# Patient Record
Sex: FEMALE | Race: WHITE | NOT HISPANIC OR LATINO | Employment: OTHER | URBAN - METROPOLITAN AREA
[De-identification: names, ages, dates, MRNs, and addresses within clinical notes are randomized per-mention and may not be internally consistent; named-entity substitution may affect disease eponyms.]

---

## 2024-01-26 ENCOUNTER — OFFICE VISIT (OUTPATIENT)
Dept: PRIMARY CARE | Facility: CLINIC | Age: 74
End: 2024-01-26
Payer: COMMERCIAL

## 2024-01-26 ENCOUNTER — APPOINTMENT (OUTPATIENT)
Dept: PRIMARY CARE | Facility: CLINIC | Age: 74
End: 2024-01-26
Payer: COMMERCIAL

## 2024-01-26 VITALS
WEIGHT: 179 LBS | HEART RATE: 82 BPM | SYSTOLIC BLOOD PRESSURE: 152 MMHG | BODY MASS INDEX: 28.46 KG/M2 | OXYGEN SATURATION: 96 % | DIASTOLIC BLOOD PRESSURE: 82 MMHG

## 2024-01-26 DIAGNOSIS — Z79.899 HIGH RISK MEDICATION USE: ICD-10-CM

## 2024-01-26 DIAGNOSIS — M47.812 CERVICAL SPONDYLOSIS: ICD-10-CM

## 2024-01-26 DIAGNOSIS — I10 PRIMARY HYPERTENSION: ICD-10-CM

## 2024-01-26 DIAGNOSIS — M48.02 CERVICAL STENOSIS OF SPINE: Primary | ICD-10-CM

## 2024-01-26 PROCEDURE — 3079F DIAST BP 80-89 MM HG: CPT | Performed by: STUDENT IN AN ORGANIZED HEALTH CARE EDUCATION/TRAINING PROGRAM

## 2024-01-26 PROCEDURE — 99214 OFFICE O/P EST MOD 30 MIN: CPT | Performed by: STUDENT IN AN ORGANIZED HEALTH CARE EDUCATION/TRAINING PROGRAM

## 2024-01-26 PROCEDURE — 1159F MED LIST DOCD IN RCRD: CPT | Performed by: STUDENT IN AN ORGANIZED HEALTH CARE EDUCATION/TRAINING PROGRAM

## 2024-01-26 PROCEDURE — 80361 OPIATES 1 OR MORE: CPT

## 2024-01-26 PROCEDURE — 80368 SEDATIVE HYPNOTICS: CPT

## 2024-01-26 PROCEDURE — 3077F SYST BP >= 140 MM HG: CPT | Performed by: STUDENT IN AN ORGANIZED HEALTH CARE EDUCATION/TRAINING PROGRAM

## 2024-01-26 PROCEDURE — 1160F RVW MEDS BY RX/DR IN RCRD: CPT | Performed by: STUDENT IN AN ORGANIZED HEALTH CARE EDUCATION/TRAINING PROGRAM

## 2024-01-26 PROCEDURE — 1125F AMNT PAIN NOTED PAIN PRSNT: CPT | Performed by: STUDENT IN AN ORGANIZED HEALTH CARE EDUCATION/TRAINING PROGRAM

## 2024-01-26 PROCEDURE — 80346 BENZODIAZEPINES1-12: CPT

## 2024-01-26 PROCEDURE — 82570 ASSAY OF URINE CREATININE: CPT

## 2024-01-26 PROCEDURE — 80365 DRUG SCREENING OXYCODONE: CPT

## 2024-01-26 PROCEDURE — 80307 DRUG TEST PRSMV CHEM ANLYZR: CPT

## 2024-01-26 PROCEDURE — 80373 DRUG SCREENING TRAMADOL: CPT

## 2024-01-26 PROCEDURE — 80354 DRUG SCREENING FENTANYL: CPT

## 2024-01-26 PROCEDURE — 80358 DRUG SCREENING METHADONE: CPT

## 2024-01-26 RX ORDER — DORZOLAMIDE HYDROCHLORIDE AND TIMOLOL MALEATE 20; 5 MG/ML; MG/ML
SOLUTION/ DROPS OPHTHALMIC
COMMUNITY

## 2024-01-26 RX ORDER — IBUPROFEN 800 MG/1
800 TABLET ORAL 3 TIMES DAILY PRN
COMMUNITY
End: 2024-01-30 | Stop reason: SDUPTHER

## 2024-01-26 RX ORDER — LISINOPRIL 40 MG/1
40 TABLET ORAL DAILY
COMMUNITY
End: 2024-01-29 | Stop reason: SDUPTHER

## 2024-01-26 RX ORDER — BRIMONIDINE TARTRATE 2 MG/ML
SOLUTION/ DROPS OPHTHALMIC
COMMUNITY
Start: 2023-09-27

## 2024-01-26 RX ORDER — LOTEPREDNOL ETABONATE 5 MG/ML
SUSPENSION/ DROPS OPHTHALMIC
COMMUNITY
Start: 2024-01-25 | End: 2024-06-04 | Stop reason: ALTCHOICE

## 2024-01-26 RX ORDER — ASPIRIN 81 MG/1
TABLET ORAL
COMMUNITY
Start: 2018-05-25

## 2024-01-26 RX ORDER — TRAMADOL HYDROCHLORIDE 50 MG/1
4 TABLET ORAL NIGHTLY PRN
COMMUNITY
Start: 2023-11-24 | End: 2024-01-29 | Stop reason: SDUPTHER

## 2024-01-26 RX ORDER — TRAMADOL HYDROCHLORIDE 50 MG/1
4 TABLET ORAL NIGHTLY PRN
Qty: 10 TABLET | Status: CANCELLED | OUTPATIENT
Start: 2024-01-26

## 2024-01-26 RX ORDER — LATANOPROST 50 UG/ML
SOLUTION/ DROPS OPHTHALMIC
COMMUNITY

## 2024-01-26 RX ORDER — IBUPROFEN 800 MG/1
800 TABLET ORAL 3 TIMES DAILY PRN
Status: CANCELLED | OUTPATIENT
Start: 2024-01-26

## 2024-01-26 RX ORDER — LISINOPRIL 40 MG/1
40 TABLET ORAL DAILY
Qty: 90 TABLET | Refills: 1 | Status: CANCELLED | OUTPATIENT
Start: 2024-01-26

## 2024-01-26 ASSESSMENT — PAIN SCALES - GENERAL: PAINLEVEL: 4

## 2024-01-26 ASSESSMENT — PATIENT HEALTH QUESTIONNAIRE - PHQ9
1. LITTLE INTEREST OR PLEASURE IN DOING THINGS: NOT AT ALL
SUM OF ALL RESPONSES TO PHQ9 QUESTIONS 1 AND 2: 0
2. FEELING DOWN, DEPRESSED OR HOPELESS: NOT AT ALL

## 2024-01-26 NOTE — PROGRESS NOTES
GENERAL PROGRESS NOTE    Chief Complaint   Patient presents with    [facility]    Hypertension       HPI  Breonna Russo is a 73 y.o. female that presents today for HTN check and medication review. Prior pt of Charisse Stephenson, this is our first meeting. Last visit 9/2023 for HTN check. BP elevated at that time with plan to add antihypertensive agent if persistent. No follow up since.     #HTN  Current medications - Lisinopril 40 mg   BP today - 152/82  Home BP readings - not checking frequently    #Chronic neck and back pain, OA - multiple joints  Has had Rt shoulder, Lt, knee, Rt 1st metatarsal replacements  Has been under PM Dr. Pandya, was being prescribed percocet 5-325mg from Dr. Pandya off and on from 8/2022 - 1/2023. During that time she also has tramadol prescribed by [facility] intermittently. She says she has not seen Dr. Pandya in long while and does not have any planned follow up due to no further interventions were planned for her condition. She has been using the Tramadol PRN and feels she does well on that. Her last fill was for 30 pills on 11/24/2023. She has good days and bad days with her neck and back pain, on the bad days she struggles with her ADL's and the tramadol helps with this.  No recreational drug use. Drinks ocass wine in the evening up to 2 glasses, never drinks wine on days she takes Tramadol.     OARRS:  Kezia Key MD on 1/26/2024  8:39 AM  I have personally reviewed the OARRS report for Breonna Russo. I have considered the risks of abuse, dependence, addiction and diversion    Is the patient prescribed a combination of a benzodiazepine and opioid?  No  Last Urine Drug Screen / ordered today: Yes    Controlled Substance Agreement:  Date of the Last Agreement: TODAY 1/26/24  Reviewed Controlled Substance Agreement including but not limited to the benefits, risks, and  alternatives to treatment with a Controlled Substance medication(s).    Opioids:  What is the patient's goal of therapy? Improve daily functioning  Is this being achieved with current treatment? yes    I have calculated the patient's Morphine Dose Equivalent (MED): 5    I feel that it is clinically indicated to continue this current medication regimen after consideration of alternative therapies, and other non-opioid treatment.    Opioid Risk Screening:  Family History of Substance Abuse  Alcohol: 0 (2024  8:00 AM)  Illegal Dru (2024  8:00 AM)  Prescription Dru (2024  8:00 AM)    Personal History of Substance Abuse  Alcohol: 0 (2024  8:00 AM)  Illegal Drugs: 0 (2024  8:00 AM)  Prescription Drugs: 0 (2024  8:00 AM)        Pain Assessment:  No data recorded    CPE due 2024    Vital signs   /82   Pulse 82   Wt 81.2 kg (179 lb)   SpO2 96%   BMI 28.46 kg/m²      No current outpatient medications on file.     No current facility-administered medications for this visit.       Review of Systems   Constitutional:  Negative for chills and fever.   Eyes:  Negative for visual disturbance.   Respiratory:  Negative for shortness of breath.    Cardiovascular:  Negative for chest pain, palpitations and leg swelling.   Musculoskeletal:  Positive for back pain (chronic) and neck pain (chronic).   Neurological:  Negative for dizziness and headaches.        Physical Exam  Vitals and nursing note reviewed.   Constitutional:       General: She is not in acute distress.     Appearance: She is not ill-appearing.   Cardiovascular:      Rate and Rhythm: Normal rate and regular rhythm.      Heart sounds: Normal heart sounds. No murmur heard.  Pulmonary:      Effort: Pulmonary effort is normal. No respiratory distress.      Breath sounds: Normal breath sounds. No wheezing, rhonchi or rales.   Musculoskeletal:      Right lower leg: No edema.      Left lower leg: No edema.         Assessment/Plan    1. Cervical stenosis of spine  2. Cervical spondylosis  3. High risk medication use  3. High risk medication use  Currently doing well current regimen. Did discuss with her referral to pain management to determine long term plan for pain control and if current regimen is best option or if there are alternatives that have potentially not yet been explored. She is agreeable to this. She would like to go back to Dr. Pandya for this. Will schedule  Dosing and appropriate use discussed  Risks/Precautions discussed in detail  No red flags identified  OARRS reviewed, controlled med contract updated.  Return 3 mos for medication review  Urine testing done today  Controlled substance agreement updated today  - ibuprofen (IBU) 800 mg tablet; Take 1 tablet (800 mg) by mouth 3 times a day as needed for moderate pain (4 - 6).  Dispense: 60 tablet; Refill: 1  - Opiate/Opioid/Benzo Prescription Compliance  - OOB Internal Tracking    4. Primary hypertension  Needs better control. Add amlodipine 5mg daily. Medication indications, use and potential side effects discussed in detail. She will begin home monitoring 2-3 x weekly. Proper technique reviewed. Follow up 3 mos for HTN check.       Kezia Key MD  01/25/24  10:05 PM

## 2024-01-27 LAB
AMPHETAMINES UR QL SCN: NORMAL
BARBITURATES UR QL SCN: NORMAL
BZE UR QL SCN: NORMAL
CANNABINOIDS UR QL SCN: NORMAL
CREAT UR-MCNC: 83.7 MG/DL (ref 20–320)
PCP UR QL SCN: NORMAL

## 2024-01-29 DIAGNOSIS — I10 HYPERTENSION, UNSPECIFIED TYPE: Primary | ICD-10-CM

## 2024-01-29 DIAGNOSIS — M48.02 CERVICAL STENOSIS OF SPINAL CANAL: ICD-10-CM

## 2024-01-29 DIAGNOSIS — M47.812 CERVICAL SPONDYLOSIS: ICD-10-CM

## 2024-01-29 NOTE — TELEPHONE ENCOUNTER
Patient stated that her provider was going to send over a prescription for an additional blood pressure I did not see that on her list .      Rx Refill Request Telephone Encounter    Name:  Breonna Russo  :  [id number]  Medication Name:  lisinopril 40 mg tablet       Specific Pharmacy location:  [facility]     Rx Refill Request Telephone Encounter    Name:  Breonna Russo  :  [id number]  Medication Name:   mg tablet     Specific Pharmacy location:  [facility]    Best number to reach patient:  [phone]    Rx Refill Request Telephone Encounter    Name:  Breonna Russo  :  [id number]  Medication Name:   traMADol (Ultram) 50 mg tablet

## 2024-01-30 ENCOUNTER — TELEPHONE (OUTPATIENT)
Dept: PRIMARY CARE | Facility: CLINIC | Age: 74
End: 2024-01-30
Payer: COMMERCIAL

## 2024-01-30 RX ORDER — IBUPROFEN 800 MG/1
800 TABLET ORAL 3 TIMES DAILY PRN
Qty: 60 TABLET | Refills: 1 | Status: SHIPPED | OUTPATIENT
Start: 2024-01-30 | End: 2024-01-30 | Stop reason: SDUPTHER

## 2024-01-30 RX ORDER — AMLODIPINE BESYLATE 5 MG/1
5 TABLET ORAL DAILY
Qty: 90 TABLET | Refills: 1 | Status: SHIPPED | OUTPATIENT
Start: 2024-01-30 | End: 2024-04-30 | Stop reason: SINTOL

## 2024-01-30 RX ORDER — TRAMADOL HYDROCHLORIDE 50 MG/1
50 TABLET ORAL DAILY PRN
Qty: 30 TABLET | Refills: 0 | Status: SHIPPED | OUTPATIENT
Start: 2024-01-30 | End: 2024-04-30 | Stop reason: SDUPTHER

## 2024-01-30 RX ORDER — IBUPROFEN 800 MG/1
800 TABLET ORAL 3 TIMES DAILY PRN
Qty: 60 TABLET | Refills: 1 | Status: SHIPPED | OUTPATIENT
Start: 2024-01-30 | End: 2024-04-30 | Stop reason: SDUPTHER

## 2024-01-30 RX ORDER — LISINOPRIL 40 MG/1
40 TABLET ORAL DAILY
Qty: 90 TABLET | Refills: 1 | Status: SHIPPED | OUTPATIENT
Start: 2024-01-30 | End: 2024-06-04 | Stop reason: SDUPTHER

## 2024-01-30 NOTE — TELEPHONE ENCOUNTER
Lisinopril and tramadol rx sent   New rx for amlodipine sent   She needs follow up scheduled for 90 days from last visit (1/26/24) for med review - tramadol and HTN check

## 2024-01-30 NOTE — TELEPHONE ENCOUNTER
RX REQUEST  [facility]INO GIBSON   LISINOPRIL  IBUPROFEN 800 MG   TRAMADOL  AMLODIPINE     PT WILL BE OUT OF MEDS BY THE END OF WEEK

## 2024-02-01 ENCOUNTER — TELEPHONE (OUTPATIENT)
Dept: PRIMARY CARE | Facility: CLINIC | Age: 74
End: 2024-02-01
Payer: COMMERCIAL

## 2024-02-01 DIAGNOSIS — I10 HYPERTENSION, UNSPECIFIED TYPE: ICD-10-CM

## 2024-02-02 ASSESSMENT — ENCOUNTER SYMPTOMS
HEADACHES: 0
CHILLS: 0
DIZZINESS: 0
FEVER: 0
PALPITATIONS: 0
NECK PAIN: 1
BACK PAIN: 1
SHORTNESS OF BREATH: 0

## 2024-04-29 PROBLEM — F10.10 ALCOHOL ABUSE: Status: ACTIVE | Noted: 2023-05-05

## 2024-04-29 PROBLEM — R76.8 POSITIVE ANA (ANTINUCLEAR ANTIBODY): Status: ACTIVE | Noted: 2024-04-29

## 2024-04-29 PROBLEM — I10 BENIGN ESSENTIAL HYPERTENSION: Status: ACTIVE | Noted: 2020-07-09

## 2024-04-29 PROBLEM — M85.60 BONE CYST: Status: ACTIVE | Noted: 2024-04-29

## 2024-04-29 PROBLEM — L30.9 ECZEMA: Status: ACTIVE | Noted: 2018-08-27

## 2024-04-29 PROBLEM — N32.81 OAB (OVERACTIVE BLADDER): Status: ACTIVE | Noted: 2024-04-29

## 2024-04-29 PROBLEM — H40.9 GLAUCOMA: Status: ACTIVE | Noted: 2024-04-29

## 2024-04-29 PROBLEM — M19.90 OSTEOARTHRITIS: Status: ACTIVE | Noted: 2019-05-02

## 2024-04-29 PROBLEM — M87.00 AVASCULAR NECROSIS OF BONE (MULTI): Status: ACTIVE | Noted: 2024-04-29

## 2024-04-29 PROBLEM — M17.9 OSTEOARTHRITIS OF KNEE: Status: ACTIVE | Noted: 2019-04-09

## 2024-04-29 RX ORDER — PREDNISOLONE ACETATE 10 MG/ML
SUSPENSION/ DROPS OPHTHALMIC
COMMUNITY
Start: 2024-01-26 | End: 2024-04-30 | Stop reason: ALTCHOICE

## 2024-04-30 ENCOUNTER — OFFICE VISIT (OUTPATIENT)
Dept: PRIMARY CARE | Facility: CLINIC | Age: 74
End: 2024-04-30
Payer: COMMERCIAL

## 2024-04-30 ENCOUNTER — LAB (OUTPATIENT)
Dept: LAB | Facility: LAB | Age: 74
End: 2024-04-30
Payer: COMMERCIAL

## 2024-04-30 ENCOUNTER — TELEPHONE (OUTPATIENT)
Dept: PRIMARY CARE | Facility: CLINIC | Age: 74
End: 2024-04-30

## 2024-04-30 VITALS
WEIGHT: 178 LBS | SYSTOLIC BLOOD PRESSURE: 182 MMHG | DIASTOLIC BLOOD PRESSURE: 96 MMHG | TEMPERATURE: 97.2 F | BODY MASS INDEX: 28.3 KG/M2 | HEART RATE: 72 BPM | OXYGEN SATURATION: 95 %

## 2024-04-30 DIAGNOSIS — Z79.899 HIGH RISK MEDICATION USE: ICD-10-CM

## 2024-04-30 DIAGNOSIS — M47.812 CERVICAL SPONDYLOSIS: ICD-10-CM

## 2024-04-30 DIAGNOSIS — I10 PRIMARY HYPERTENSION: Primary | ICD-10-CM

## 2024-04-30 DIAGNOSIS — I10 HYPERTENSION, UNSPECIFIED TYPE: ICD-10-CM

## 2024-04-30 DIAGNOSIS — M48.02 CERVICAL STENOSIS OF SPINAL CANAL: ICD-10-CM

## 2024-04-30 DIAGNOSIS — I10 PRIMARY HYPERTENSION: ICD-10-CM

## 2024-04-30 DIAGNOSIS — M48.02 CERVICAL STENOSIS OF SPINE: ICD-10-CM

## 2024-04-30 LAB
ANION GAP SERPL CALC-SCNC: 13 MMOL/L
BUN SERPL-MCNC: 10 MG/DL (ref 8–25)
CALCIUM SERPL-MCNC: 9.1 MG/DL (ref 8.5–10.4)
CHLORIDE SERPL-SCNC: 103 MMOL/L (ref 97–107)
CO2 SERPL-SCNC: 24 MMOL/L (ref 24–31)
CREAT SERPL-MCNC: 0.8 MG/DL (ref 0.4–1.6)
EGFRCR SERPLBLD CKD-EPI 2021: 78 ML/MIN/1.73M*2
GLUCOSE SERPL-MCNC: 113 MG/DL (ref 65–99)
POTASSIUM SERPL-SCNC: 5 MMOL/L (ref 3.4–5.1)
SODIUM SERPL-SCNC: 140 MMOL/L (ref 133–145)

## 2024-04-30 PROCEDURE — 1159F MED LIST DOCD IN RCRD: CPT

## 2024-04-30 PROCEDURE — 3077F SYST BP >= 140 MM HG: CPT

## 2024-04-30 PROCEDURE — 36415 COLL VENOUS BLD VENIPUNCTURE: CPT

## 2024-04-30 PROCEDURE — 80048 BASIC METABOLIC PNL TOTAL CA: CPT

## 2024-04-30 PROCEDURE — 99214 OFFICE O/P EST MOD 30 MIN: CPT

## 2024-04-30 PROCEDURE — 1160F RVW MEDS BY RX/DR IN RCRD: CPT

## 2024-04-30 PROCEDURE — 1125F AMNT PAIN NOTED PAIN PRSNT: CPT

## 2024-04-30 PROCEDURE — 3080F DIAST BP >= 90 MM HG: CPT

## 2024-04-30 RX ORDER — METOPROLOL SUCCINATE 25 MG/1
25 TABLET, EXTENDED RELEASE ORAL DAILY
Qty: 30 TABLET | Refills: 1 | Status: SHIPPED | OUTPATIENT
Start: 2024-04-30 | End: 2024-06-04 | Stop reason: SDUPTHER

## 2024-04-30 RX ORDER — METOPROLOL SUCCINATE 25 MG/1
25 TABLET, EXTENDED RELEASE ORAL DAILY
Qty: 30 TABLET | Refills: 1 | Status: SHIPPED | OUTPATIENT
Start: 2024-04-30 | End: 2024-04-30 | Stop reason: SDUPTHER

## 2024-04-30 RX ORDER — IBUPROFEN 800 MG/1
800 TABLET ORAL 3 TIMES DAILY PRN
Qty: 60 TABLET | Refills: 1 | Status: SHIPPED | OUTPATIENT
Start: 2024-04-30

## 2024-04-30 RX ORDER — TRAMADOL HYDROCHLORIDE 50 MG/1
50 TABLET ORAL DAILY PRN
Qty: 30 TABLET | Refills: 0 | Status: SHIPPED | OUTPATIENT
Start: 2024-04-30 | End: 2024-06-04 | Stop reason: SDUPTHER

## 2024-04-30 ASSESSMENT — PAIN SCALES - GENERAL: PAINLEVEL: 7

## 2024-04-30 ASSESSMENT — LIFESTYLE VARIABLES: TOTAL SCORE: 0

## 2024-04-30 NOTE — TELEPHONE ENCOUNTER
Called and spoke to patient, she verbalized understanding and is aware at this time. Did state she went and did her labs this morning as well, just an FYI

## 2024-04-30 NOTE — TELEPHONE ENCOUNTER
Patient called  [phone] was seen today MARY was changing her BP RX from Amlodipine to Metoprolol but looks like they filled Amlodipine again. [facility] [place]

## 2024-04-30 NOTE — PROGRESS NOTES
Subjective   Patient ID: Breonna Russo is a 74 y.o. female who presents for Hypertension and Med Management.      HPI  Breonna Russo is a 73 y.o. female that presents today for HTN check and medication review. Patient of Dr. Kezia Key, this is our first meeting. Last visit on 1/26/2024 for HTN check. Amlodipine 5 mg added to her regimen at that vist by Dr. Key. She reports she was taking the amlodipine for several weeks then began having lower extremity edema, so she stopped taking the medication altogether about 4 weeks ago. Swelling of the lower legs resolved.     #HTN  Current medications - Lisinopril 40 mg, not taking the Amlodipine 5 mg daily  BP today - 182/96  Home BP readings - not checking frequently, reports readings 150s/80s when she does check  Contributing factors: smoking, chronic pain, NSAID use, sedentary lifestyle, post-anne  Denies change in vision, headache, or dizziness.   No complaints of chest pain, palpitations, or shortness of breath.    #Chronic neck and back pain, OA - multiple joints  Has had Rt shoulder, Lt, knee, Rt 1st metatarsal replacements  Has been under PM Dr. Pandya, was being prescribed percocet 5-325mg from Dr. Pandya off and on from 8/2022 - 1/2023. During that time she also has tramadol prescribed by K intermittently. She says she has not seen Dr. Pandya in long while and does not have any planned follow up due to no further interventions were planned for her condition. She has been using the Tramadol PRN and feels she does well on that. Her last fill was for 30 pills on 1/30/2024. She has good days and bad days with her neck and back pain, on the bad days she struggles with her ADL's and the tramadol helps with this.  No recreational drug use. Drinks ocass wine in the evening up to 2 glasses, never drinks wine on days she takes Tramadol.     OARRS:  AIXA Goyal-CNP on 4/30/2024  8:34 AM  I have personally reviewed the OARRS report for Breonna Russo. I  have considered the risks of abuse, dependence, addiction and diversion    Is the patient prescribed a combination of a benzodiazepine and opioid?  No    Last Urine Drug Screen / ordered today: No  Recent Results (from the past 8760 hour(s))   Confirmation Opiate/Opioid/Benzo Prescription Compliance    Collection Time: 01/26/24 10:01 AM   Result Value Ref Range    Clonazepam <25 <25 ng/mL    7-Aminoclonazepam <25 <25 ng/mL    Alprazolam <25 <25 ng/mL    Alpha-Hydroxyalprazolam <25 <25 ng/mL    Midazolam <25 <25 ng/mL    Alpha-Hydroxymidazolam <25 <25 ng/mL    Chlordiazepoxide <25 <25 ng/mL    Diazepam <25 <25 ng/mL    Nordiazepam <25 <25 ng/mL    Temazepam <25 <25 ng/mL    Oxazepam <25 <25 ng/mL    Lorazepam <25 <25 ng/mL    Methadone <25 <25 ng/mL    EDDP <25 <25 ng/mL    6-Acetylmorphine <25 <25 ng/mL    Codeine <50 <50 ng/mL    Hydrocodone <25 <25 ng/mL    Hydromorphone <25 <25 ng/mL    Morphine  <50 <50 ng/mL    Norhydrocodone <25 <25 ng/mL    Noroxycodone <25 <25 ng/mL    Oxycodone <25 <25 ng/mL    Oxymorphone <25 <25 ng/mL    Fentanyl <2.5 <2.5 ng/mL    Norfentanyl <2.5 <2.5 ng/mL    Tramadol <50 <50 ng/mL    O-Desmethyltramadol <50 <50 ng/mL    Zolpidem <25 <25 ng/mL    Zolpidem Metabolite (ZCA) <25 <25 ng/mL   Screen Opiate/Opioid/Benzo Prescription Compliance    Collection Time: 01/26/24 10:01 AM   Result Value Ref Range    Creatinine, Urine Random 83.7 20.0 - 320.0 mg/dL    Amphetamine Screen, Urine Presumptive Negative Presumptive Negative    Barbiturate Screen, Urine Presumptive Negative Presumptive Negative    Cannabinoid Screen, Urine Presumptive Negative Presumptive Negative    Cocaine Metabolite Screen, Urine Presumptive Negative Presumptive Negative    PCP Screen, Urine Presumptive Negative Presumptive Negative     Results are as expected.         Controlled Substance Agreement:  Date of the Last Agreement: 1/26/2024  Reviewed Controlled Substance Agreement including but not limited to the benefits,  risks, and alternatives to treatment with a Controlled Substance medication(s).    Opioids:  What is the patient's goal of therapy? Improve OA pain for improved daily functioning  Is this being achieved with current treatment? yes    I have calculated the patient's Morphine Dose Equivalent (MED): 5 MME/day  I have considered referral to Pain Management and/or a specialist, and do not feel it is necessary at this time.    I feel that it is clinically indicated to continue this current medication regimen after consideration of alternative therapies, and other non-opioid treatment.    Opioid Risk Screening:  Family History of Substance Abuse  Alcohol: 0 (2024  8:00 AM)  Illegal Dru (2024  8:00 AM)  Prescription Dru (2024  8:00 AM)    Personal History of Substance Abuse  Alcohol: 0 (2024  8:00 AM)  Illegal Drugs: 0 (2024  8:00 AM)  Prescription Drugs: 0 (2024  8:00 AM)    Patient Age (16-45)  Age (16-45): 0 (2024  8:00 AM)    History of Preadolescent Sexual Abuse  History of Preadolescent Sexual Abuse: 0 (2024  8:00 AM)    Psychological Disease  Attention Deficit Disorder, Obsessive Compulsive Disorder, Bipolar, Schizophrenia: 0 (2024  8:00 AM)  Depression: 0 (2024  8:00 AM)    Total Score  Total Score: 0 (2024  8:00 AM)        Pain Assessment:  Analgesia  What was your pain level on average during the past week?: 9  What was your pain level at its worst during the past week?: 10 - Pain as bad as it can be  What percentage of your pain has been relieved during the past week?: 0 % (Has been out of medication)  Is the amount of pain relief you are now obtaining from your current pain relievers enough to make a real difference in your life?: Y  Query to Clinician: Is the patient's pain relief clinically significant?: Yes    Activities of Daily Living  Physical Functioning: Same  Family Relationships: Same  Social Relationships: Same  Mood: Same  Sleep Patterns:  Same  Overall Functioning: Same    Adverse Events  Is patient experiencing any side effects from current pain relievers?: N  Patient's Overall Severity of Side Effects: None      Assessment  Is your overall impression that this patient is benefiting from opioid therapy?: Yes        BP (!) 182/96 (BP Location: Left arm)   Pulse 72   Temp 36.2 °C (97.2 °F) (Temporal)   Wt 80.7 kg (178 lb)   SpO2 95%   BMI 28.30 kg/m²      Review of Systems  All other systems have been reviewed and are negative except as noted in the HPI.       Objective   Physical Exam  Vitals and nursing note reviewed.   Constitutional:       General: She is not in acute distress.     Appearance: Normal appearance.   Neck:      Vascular: No carotid bruit.   Cardiovascular:      Rate and Rhythm: Normal rate and regular rhythm.      Pulses: Normal pulses.      Heart sounds: Normal heart sounds, S1 normal and S2 normal. No murmur heard.  Pulmonary:      Effort: Pulmonary effort is normal.      Breath sounds: Normal breath sounds and air entry.   Musculoskeletal:      Cervical back: Normal range of motion and neck supple.      Right lower leg: No edema.      Left lower leg: No edema.   Lymphadenopathy:      Cervical: No cervical adenopathy.   Skin:     General: Skin is warm and dry.   Neurological:      General: No focal deficit present.      Mental Status: She is alert. Mental status is at baseline.   Psychiatric:         Behavior: Behavior is cooperative.           Assessment/Plan   Problem List Items Addressed This Visit             ICD-10-CM    Hypertension - Primary  Chronic condition, needs improvement at this visit.  Begin metoprolol XL 25 mg daily at bedtime at this visit.  Continue lisinopril 40 mg daily as prescribed  Call if blood pressure consistently >140/90.  Non pharmacological interventions such as lowering salt, saturated fats, cholesterol, and sugar diet discussed.  Increase physical activity, aim for 30 minutes 5 days per week as  able.  Stress reduction interventions discussed.  Discussed signs and symptoms of major cardiovascular event and need to present to the ED.   Lab work not completed for this visit as ordered, reminded her to obtain. Will review upon result.   Follow up in 1 month for blood pressure re-evaluation.    I10    Relevant Medications    metoprolol succinate XL (Toprol-XL) 25 mg 24 hr tablet     Other Visit Diagnoses         Codes    Cervical stenosis of spinal canal     M48.02    Relevant Medications    traMADol (Ultram) 50 mg tablet    ibuprofen (IBU) 800 mg tablet    Cervical spondylosis     M47.812    Relevant Medications    traMADol (Ultram) 50 mg tablet    ibuprofen (IBU) 800 mg tablet    Cervical stenosis of spine     M48.02    Relevant Medications    ibuprofen (IBU) 800 mg tablet     High Risk Medication Use  Currently doing well on current regimen.    She has not arranged appointment with Dr. Pandya as previously discussed.   Reminded to make appointment as previously referred by PCP.   Dosing and appropriate use discussed  Risks/Precautions discussed in detail  No red flags identified  OARRS reviewed, controlled med contract updated.  Return 3 mos for medication review

## 2024-05-20 ENCOUNTER — HOSPITAL ENCOUNTER (OUTPATIENT)
Dept: RADIOLOGY | Facility: CLINIC | Age: 74
Discharge: HOME | End: 2024-05-20
Payer: COMMERCIAL

## 2024-05-20 DIAGNOSIS — M54.16 RADICULOPATHY, LUMBAR REGION: ICD-10-CM

## 2024-05-20 PROCEDURE — 72148 MRI LUMBAR SPINE W/O DYE: CPT

## 2024-05-20 PROCEDURE — 72148 MRI LUMBAR SPINE W/O DYE: CPT | Performed by: RADIOLOGY

## 2024-05-30 ENCOUNTER — APPOINTMENT (OUTPATIENT)
Dept: RADIOLOGY | Facility: CLINIC | Age: 74
End: 2024-05-30
Payer: COMMERCIAL

## 2024-06-04 ENCOUNTER — OFFICE VISIT (OUTPATIENT)
Dept: PRIMARY CARE | Facility: CLINIC | Age: 74
End: 2024-06-04
Payer: COMMERCIAL

## 2024-06-04 VITALS
DIASTOLIC BLOOD PRESSURE: 84 MMHG | SYSTOLIC BLOOD PRESSURE: 140 MMHG | WEIGHT: 178.4 LBS | HEART RATE: 67 BPM | OXYGEN SATURATION: 97 % | BODY MASS INDEX: 28.36 KG/M2

## 2024-06-04 DIAGNOSIS — M47.812 CERVICAL SPONDYLOSIS: ICD-10-CM

## 2024-06-04 DIAGNOSIS — M48.02 CERVICAL STENOSIS OF SPINAL CANAL: ICD-10-CM

## 2024-06-04 DIAGNOSIS — I10 PRIMARY HYPERTENSION: Primary | ICD-10-CM

## 2024-06-04 PROCEDURE — 3079F DIAST BP 80-89 MM HG: CPT

## 2024-06-04 PROCEDURE — 1160F RVW MEDS BY RX/DR IN RCRD: CPT

## 2024-06-04 PROCEDURE — 1125F AMNT PAIN NOTED PAIN PRSNT: CPT

## 2024-06-04 PROCEDURE — 1159F MED LIST DOCD IN RCRD: CPT

## 2024-06-04 PROCEDURE — 3077F SYST BP >= 140 MM HG: CPT

## 2024-06-04 PROCEDURE — 99214 OFFICE O/P EST MOD 30 MIN: CPT

## 2024-06-04 RX ORDER — LISINOPRIL 40 MG/1
40 TABLET ORAL DAILY
Qty: 90 TABLET | Refills: 1 | Status: SHIPPED | OUTPATIENT
Start: 2024-06-04

## 2024-06-04 RX ORDER — METOPROLOL SUCCINATE 25 MG/1
25 TABLET, EXTENDED RELEASE ORAL DAILY
Qty: 90 TABLET | Refills: 1 | Status: SHIPPED | OUTPATIENT
Start: 2024-06-04 | End: 2024-12-01

## 2024-06-04 RX ORDER — TRAMADOL HYDROCHLORIDE 50 MG/1
50 TABLET ORAL DAILY PRN
Qty: 30 TABLET | Refills: 0 | Status: SHIPPED | OUTPATIENT
Start: 2024-06-04

## 2024-06-04 ASSESSMENT — PATIENT HEALTH QUESTIONNAIRE - PHQ9
2. FEELING DOWN, DEPRESSED OR HOPELESS: NOT AT ALL
SUM OF ALL RESPONSES TO PHQ9 QUESTIONS 1 AND 2: 0
1. LITTLE INTEREST OR PLEASURE IN DOING THINGS: NOT AT ALL

## 2024-06-04 ASSESSMENT — ENCOUNTER SYMPTOMS
ABDOMINAL PAIN: 0
CONSTIPATION: 0
WEAKNESS: 0
LIGHT-HEADEDNESS: 0
FREQUENCY: 0
BLURRED VISION: 0
ACTIVITY CHANGE: 0
PND: 0
SWEATS: 0
NECK PAIN: 0
HYPERTENSION: 1
FATIGUE: 0
DYSURIA: 0
PALPITATIONS: 0
NAUSEA: 0
DIZZINESS: 0
UNEXPECTED WEIGHT CHANGE: 0
COUGH: 0
CHEST TIGHTNESS: 0
ORTHOPNEA: 0
APPETITE CHANGE: 0
HEADACHES: 0
DIARRHEA: 0
COLOR CHANGE: 0
SHORTNESS OF BREATH: 0
VOMITING: 0

## 2024-06-04 ASSESSMENT — PAIN SCALES - GENERAL: PAINLEVEL: 8

## 2024-06-04 NOTE — PROGRESS NOTES
Subjective   Patient ID: Breonna Russo is a 74 y.o. female who presents for Hypertension.  Hypertension  This is a recurrent problem. The current episode started more than 1 year ago. The problem has been waxing and waning since onset. The problem is controlled. Pertinent negatives include no anxiety, blurred vision, chest pain, headaches, malaise/fatigue, neck pain, orthopnea, palpitations, peripheral edema, PND, shortness of breath or sweats.       Breonna presents for follow up of essential hypertension.   Taking lisinopril 40 mg, and began metoprolol XL 25 mg at last visit, about 5 weeks ago  Tolerating medications well. BP is much improved with addition of metoprolol.  Did not tolerate amlodipine well, experienced LE edema.   BP today 140/84  Home BP's ranging in 120s/70-80s.  Denies change in vision, headache, or dizziness.   No complaints of chest pain, palpitations, or shortness of breath.    #Chronic neck and back pain, OA - multiple joints  Has had Rt shoulder, Lt, knee, Rt 1st metatarsal replacements  Most recently, saw Dr. Pandya for lower back injections 1 week ago.  Has follow up his NP tomorrow.  She is reporting very little relief from the injection today.   She has been using the Tramadol PRN, takes only 3-4 times per week- typically at night when the pain is worsen while trying to sleep, and feels she does well on that. Her last fill was for 30 pills on 4/30/2024. She has good days and bad days with her neck and back pain, on the bad days she struggles with her ADL's and the tramadol helps with this.  No recreational drug use. Drinks ocass wine in the evening up to 2 glasses, never drinks wine on days she takes Tramadol.          OARRS:  Sarah Rubio, AIXA-CNP on 6/4/2024 10:55 AM  I have personally reviewed the OARRS report for Breonna Russo. I have considered the risks of abuse, dependence, addiction and diversion    Is the patient prescribed a combination of a benzodiazepine and opioid?   Yes, I feel it is clincially indicated to continue the medication and have discussed with the patient risks/benefits/alternatives.    Last Urine Drug Screen / ordered today: Yes  Recent Results (from the past 8760 hour(s))   Confirmation Opiate/Opioid/Benzo Prescription Compliance    Collection Time: 01/26/24 10:01 AM   Result Value Ref Range    Clonazepam <25 <25 ng/mL    7-Aminoclonazepam <25 <25 ng/mL    Alprazolam <25 <25 ng/mL    Alpha-Hydroxyalprazolam <25 <25 ng/mL    Midazolam <25 <25 ng/mL    Alpha-Hydroxymidazolam <25 <25 ng/mL    Chlordiazepoxide <25 <25 ng/mL    Diazepam <25 <25 ng/mL    Nordiazepam <25 <25 ng/mL    Temazepam <25 <25 ng/mL    Oxazepam <25 <25 ng/mL    Lorazepam <25 <25 ng/mL    Methadone <25 <25 ng/mL    EDDP <25 <25 ng/mL    6-Acetylmorphine <25 <25 ng/mL    Codeine <50 <50 ng/mL    Hydrocodone <25 <25 ng/mL    Hydromorphone <25 <25 ng/mL    Morphine  <50 <50 ng/mL    Norhydrocodone <25 <25 ng/mL    Noroxycodone <25 <25 ng/mL    Oxycodone <25 <25 ng/mL    Oxymorphone <25 <25 ng/mL    Fentanyl <2.5 <2.5 ng/mL    Norfentanyl <2.5 <2.5 ng/mL    Tramadol <50 <50 ng/mL    O-Desmethyltramadol <50 <50 ng/mL    Zolpidem <25 <25 ng/mL    Zolpidem Metabolite (ZCA) <25 <25 ng/mL   Screen Opiate/Opioid/Benzo Prescription Compliance    Collection Time: 01/26/24 10:01 AM   Result Value Ref Range    Creatinine, Urine Random 83.7 20.0 - 320.0 mg/dL    Amphetamine Screen, Urine Presumptive Negative Presumptive Negative    Barbiturate Screen, Urine Presumptive Negative Presumptive Negative    Cannabinoid Screen, Urine Presumptive Negative Presumptive Negative    Cocaine Metabolite Screen, Urine Presumptive Negative Presumptive Negative    PCP Screen, Urine Presumptive Negative Presumptive Negative     Results are as expected.         Controlled Substance Agreement:  Date of the Last Agreement: 1/26/2024  Reviewed Controlled Substance Agreement including but not limited to the benefits, risks, and  alternatives to treatment with a Controlled Substance medication(s).    Opioids:  What is the patient's goal of therapy? Pain management  Is this being achieved with current treatment?     I have calculated the patient's Morphine Dose Equivalent (MED):   Low risk, has begun to follow with pain management for alternate therapies, cortisone injections.  We have discussed that if medication needs adjusted, she will need to discuss this with Dr. Pandya    I feel that it is clinically indicated to continue this current medication regimen after consideration of alternative therapies, and other non-opioid treatment.    Opioid Risk Screening:  Family History of Substance Abuse  Alcohol: 0 (2024  8:00 AM)  Illegal Dru (2024  8:00 AM)  Prescription Dru (2024  8:00 AM)    Personal History of Substance Abuse  Alcohol: 0 (2024  8:00 AM)  Illegal Drugs: 0 (2024  8:00 AM)  Prescription Drugs: 0 (2024  8:00 AM)    Patient Age (16-45)  Age (16-45): 0 (2024  8:00 AM)    History of Preadolescent Sexual Abuse  History of Preadolescent Sexual Abuse: 0 (2024  8:00 AM)    Psychological Disease  Attention Deficit Disorder, Obsessive Compulsive Disorder, Bipolar, Schizophrenia: 0 (2024  8:00 AM)  Depression: 0 (2024  8:00 AM)    Total Score  Total Score: 0 (2024  8:00 AM)        /84 (BP Location: Left arm)   Pulse 67   Wt 80.9 kg (178 lb 6.4 oz)   SpO2 97%   BMI 28.36 kg/m²      Review of Systems   Constitutional:  Negative for activity change, appetite change, fatigue, malaise/fatigue and unexpected weight change.   Eyes:  Negative for blurred vision and visual disturbance.   Respiratory:  Negative for cough, chest tightness and shortness of breath.    Cardiovascular:  Negative for chest pain, palpitations, orthopnea, leg swelling and PND.   Gastrointestinal:  Negative for abdominal pain, constipation, diarrhea, nausea and vomiting.   Genitourinary:  Negative for  dysuria, frequency and urgency.   Musculoskeletal:  Negative for neck pain.   Skin:  Negative for color change and rash.   Neurological:  Negative for dizziness, syncope, weakness, light-headedness and headaches.         Objective   Physical Exam  Vitals and nursing note reviewed.   Constitutional:       General: She is not in acute distress.     Appearance: Normal appearance.   Neck:      Vascular: No carotid bruit.   Cardiovascular:      Rate and Rhythm: Normal rate and regular rhythm.      Pulses: Normal pulses.      Heart sounds: Normal heart sounds, S1 normal and S2 normal. No murmur heard.  Pulmonary:      Effort: Pulmonary effort is normal.      Breath sounds: Normal breath sounds and air entry.   Musculoskeletal:      Cervical back: Normal range of motion and neck supple.      Right lower leg: No edema.      Left lower leg: No edema.   Lymphadenopathy:      Cervical: No cervical adenopathy.   Skin:     General: Skin is warm and dry.   Neurological:      General: No focal deficit present.      Mental Status: She is alert. Mental status is at baseline.   Psychiatric:         Behavior: Behavior is cooperative.         Assessment/Plan   Problem List Items Addressed This Visit             ICD-10-CM    Hypertension - Primary    Chronic condition, stable at this visit  Continue lisinopril 40 mg daily and metoprolol XL 25 mg daily daily as prescribed.  Tolerating medications well. BP is much improved with addition of metoprolol.  Did not tolerate amlodipine well, experienced LE edema.   Monitor home blood pressures.  Call if blood pressure consistently >140/90.  Non pharmacological interventions such as lowering salt, saturated fats, cholesterol, and sugar diet discussed.  Increase physical activity, aim for 30 minutes 5 days per week as able.  Stress reduction interventions discussed.  Discussed signs and symptoms of major cardiovascular event and need to present to the ED.   Reevaluate in 6 months.   I10     Relevant Medications    lisinopril 40 mg tablet    metoprolol succinate XL (Toprol-XL) 25 mg 24 hr tablet     Other Visit Diagnoses         Codes    Cervical stenosis of spinal canal     M48.02    Relevant Medications    traMADol (Ultram) 50 mg tablet    Cervical spondylosis     M47.812    Relevant Medications    traMADol (Ultram) 50 mg tablet     Currently doing well on current regimen.    Has made appointment with Dr. Pandya as discussed.  Will plan to renew prescription for ultram x 30 days and the pain management will take over medication management for cervical pain.  Dosing and appropriate use discussed  Risks/Precautions discussed in detail  No red flags identified  OARRS reviewed, controlled med contract updated.  Return 3 mos for medication review

## 2024-06-11 ENCOUNTER — APPOINTMENT (OUTPATIENT)
Dept: RADIOLOGY | Facility: HOSPITAL | Age: 74
End: 2024-06-11
Payer: COMMERCIAL

## 2024-08-06 ENCOUNTER — EVALUATION (OUTPATIENT)
Dept: PHYSICAL THERAPY | Facility: CLINIC | Age: 74
End: 2024-08-06
Payer: COMMERCIAL

## 2024-08-06 DIAGNOSIS — M48.062 SPINAL STENOSIS, LUMBAR REGION WITH NEUROGENIC CLAUDICATION: ICD-10-CM

## 2024-08-06 PROCEDURE — 97014 ELECTRIC STIMULATION THERAPY: CPT | Mod: GP | Performed by: PHYSICAL THERAPIST

## 2024-08-06 PROCEDURE — 97162 PT EVAL MOD COMPLEX 30 MIN: CPT | Mod: GP | Performed by: PHYSICAL THERAPIST

## 2024-08-06 SDOH — ECONOMIC STABILITY: GENERAL: QUALITY OF LIFE: GOOD

## 2024-08-06 ASSESSMENT — ENCOUNTER SYMPTOMS
PAIN SCALE AT LOWEST: 5
EXACERBATED BY: KEYBOARDING
QUALITY: DULL ACHE
PAIN SCALE: 8
ALLEVIATING FACTORS: CHANGE IN POSITION
EXACERBATED BY: OVERHEAD ACTIVITY
QUALITY: DISCOMFORT
PAIN SCALE AT HIGHEST: 10
EXACERBATED BY: LIFTING
QUALITY: THROBBING
QUALITY: RADIATING
QUALITY: SHARP
QUALITY: BURNING
EXACERBATED BY: MOVEMENT

## 2024-08-06 ASSESSMENT — ACTIVITIES OF DAILY LIVING (ADL): POOR_BALANCE: 1

## 2024-08-06 NOTE — PROGRESS NOTES
Physical Therapy Evaluation and Treatment     Patient Name: Breonna Russo  MRN: [id number]  Visit Date: 2024  Time Calculation  Start Time: 1015  Stop Time: 1100  Time Calculation (min): 45 min  PT Evaluation Time Entry  PT Evaluation (Moderate) Time Entry: 25  PT Modalities Time Entry  E-Stim (Unattended) Time Entry: 15    Visit # 1 of 20  Visits/Dates Authorized: [place]    Current Problem:   Problem List Items Addressed This Visit    None  Visit Diagnoses         Codes    Spinal stenosis, lumbar region with neurogenic claudication     M48.062    Relevant Orders    Follow Up In Physical Therapy          Precautions:   Severe pain, LLE radiculopathy, lumbar spine bone spurs    Subjective Evaluation    History of Present Illness  Mechanism of injury: Pt is a 73 y/o female c/o LBP with LLE radic and states it is getting progressively worse. Pt cannot stand or walk more than a minute without pain and having to sit. Pt saw Ya and [facility] to consult for surgery and states that she needs spurs removed and possible rods. She has tried meds, steroids, injections and nothing gave her relief. Sitting to drive hurts, cannot go to grocery store even leaning on the cart. Painful to asc/desc stairs, uses handrail, has grab bars in bathroom. Pain is extreme when she has to stand and shower.  Pt has trouble sleeping and pain wakes her up. Cannot bend to clean cat litter boxes, pain with loading/unloading /washer/dryer. Burning pain down her LLE.     Quality of life: good    Pain  Current pain ratin  At best pain ratin  At worst pain rating: 10  Location: L sided low back and LLE down to her foot  Quality: burning, dull ache, radiating, sharp, throbbing and discomfort  Relieving factors: change in position (ice nor heat help)  Aggravating factors: overhead activity, movement, lifting and keyboarding (constant pain)  Progression: worsening    Social Support  Lives in: condominium and multiple-level home  Lives  with: adult roomate.    Hand dominance: right    Diagnostic Tests  X-ray: abnormal           Objective     Active Range of Motion   Cervical/Thoracic Spine     Thoracic   Flexion: with pain  Left lateral flexion: with pain  Right lateral flexion: with pain  Right rotation: with pain    Lumbar   Flexion: 15 degrees with pain  Extension: 10 degrees with pain  Left lateral flexion: 20 degrees with pain  Right lateral flexion: 20 degrees with pain  Left rotation: 25 degrees with pain  Right rotation: 25 degrees with pain    Additional Active Range of Motion Details  Pain from flexion was relieved from lumbar ext, but still pain involved    Strength/Myotome Testing     Left Hip   Planes of Motion   Flexion: 3+  Extension: 3+  Abduction: 3+  Adduction: 3+    Right Hip   Planes of Motion   Flexion: 4-  Extension: 4-  Abduction: 4-  Adduction: 4-    Left Knee   Flexion: 3+  Extension: 3+    Right Knee   Flexion: 4  Extension: 4    Left Ankle/Foot   Dorsiflexion: 4-  Plantar flexion: 4-    Right Ankle/Foot   Dorsiflexion: 4+  Plantar flexion: 4+     Pt tearful while walking from waiting room and walking back to clinic gym 2/2 LBP and LLE radic pain.   Lumbar X-ray:  Multilevel degenerative disc disease and facet arthrosis most  pronounced at L4-L5 with moderate to severe spinal canal stenosis,  moderate left and mild right neural foraminal stenosis    Objective     Active Range of Motion   Cervical/Thoracic Spine     Thoracic   Flexion: with pain  Left lateral flexion: with pain  Right lateral flexion: with pain  Right rotation: with pain    Lumbar   Flexion: 15 degrees with pain  Extension: 10 degrees with pain  Left lateral flexion: 20 degrees with pain  Right lateral flexion: 20 degrees with pain  Left rotation: 25 degrees with pain  Right rotation: 25 degrees with pain    Additional Active Range of Motion Details  Pain from flexion was relieved from lumbar ext, but still pain involved    Strength/Myotome Testing     Left  Hip   Planes of Motion   Flexion: 3+  Extension: 3+  Abduction: 3+  Adduction: 3+    Right Hip   Planes of Motion   Flexion: 4-  Extension: 4-  Abduction: 4-  Adduction: 4-    Left Knee   Flexion: 3+  Extension: 3+    Right Knee   Flexion: 4  Extension: 4    Left Ankle/Foot   Dorsiflexion: 4-  Plantar flexion: 4-    Right Ankle/Foot   Dorsiflexion: 4+  Plantar flexion: 4+               Treatments:     Therapeutic Exercise:   Could not tolerate any ther ex today 2/2 pain    Gave PPT, SKTC and LTR for HEP    Neuromuscular Re-Ed:     Gait Training:     Manual Therapy:   Did not tolerate    Modalities:   Unattended e-stim: IFC: applied to L sided lumbar paraspinals and glute, Intensity to tolerance, for 15 minutes, in Seated      Assessment & Plan     Assessment  Impairments: abnormal gait, abnormal muscle firing, abnormal or restricted ROM, activity intolerance, impaired balance, impaired physical strength, lacks appropriate home exercise program, pain with function, safety issue and weight-bearing intolerance  Assessment details: Pt presents with severe LBP and LLE radicular pain into her foot. Unable to walk from waiting room to exam room in PT without pain and crying, also demo abnormal and antalgic gait pattern. Pt did not tolerate standing, sitting, walking or MMT or any ROM testing well, caused tearful pain and sharp LLE radicular pain. Pt requires skilled therapy to address functional impairments in order to utilize traction and dry needling to reduce pain and increase her tolerance to the strengthening required to stabilize the spine and maintain optimal neutral spine and decompression.   Prognosis: fair    Plan  Therapy options: will be seen for skilled physical therapy services  Planned modality interventions: electrical stimulation/Russian stimulation, high voltage pulsed current (pain management), traction, ultrasound, TENS, microcurrent electrical stimulation and low level laser therapy  Planned therapy  interventions: abdominal trunk stabilization, ADL retraining, balance/weight-bearing training, flexibility, functional ROM exercises, home exercise program, joint mobilization, therapeutic activities, stretching, strengthening, spinal/joint mobilization, soft tissue mobilization, postural training, neuromuscular re-education and manual therapy  Frequency: 2x week  Duration in visits: 20  Duration in weeks: 20  Treatment plan discussed with: patient       Moderate complexity due to patient's clinical presentation being evolving with changing characteristics, with comorbidities/complexities to include HTN, OA, spinal stenosis, unsteadiness, LOB, TKA, TSA extremely high pain levels, inability to ambulate more than 1 minute, all of which may negatively impact rehab tolerance and progression.     Post-Treatment Pain:       Goals:   Active       PT Problem       low back       Start:  08/06/24    Expected End:  11/06/24       1) Pt will report pain levels no greater than 2/10 at worst with activity for less difficulty bending, lifting, transferring.   2) Pt will display pain-free lumbar spine AROM WFL for less difficulty bending, lifting, transferring and performing normal work activities.  3) Pt will be able to ambulate household and community distances with least restrictive AD.  4) Pt will score <10% impairment on Low Back Disability Questionnaire to indicate significant improvement in low back function.  5) Pt will improve bilateral hip and knee strength to at least 4+/5 for all major muscle groups for improved functional strength with transferring, stairs, and general mobility.

## 2024-08-14 ENCOUNTER — HOSPITAL ENCOUNTER (OUTPATIENT)
Dept: RADIOLOGY | Facility: HOSPITAL | Age: 74
Discharge: HOME | End: 2024-08-14
Payer: COMMERCIAL

## 2024-08-14 DIAGNOSIS — M48.062 SPINAL STENOSIS, LUMBAR REGION WITH NEUROGENIC CLAUDICATION: ICD-10-CM

## 2024-08-14 PROCEDURE — 72131 CT LUMBAR SPINE W/O DYE: CPT

## 2024-08-22 ENCOUNTER — APPOINTMENT (OUTPATIENT)
Dept: RADIOLOGY | Facility: HOSPITAL | Age: 74
End: 2024-08-22
Payer: COMMERCIAL

## 2024-08-28 PROBLEM — J44.9 CHRONIC OBSTRUCTIVE PULMONARY DISEASE, UNSPECIFIED (MULTI): Status: ACTIVE | Noted: 2024-08-28

## 2024-08-28 PROBLEM — M48.062 SPINAL STENOSIS OF LUMBAR REGION WITH NEUROGENIC CLAUDICATION: Status: ACTIVE | Noted: 2024-07-25

## 2024-09-04 ENCOUNTER — APPOINTMENT (OUTPATIENT)
Dept: PRIMARY CARE | Facility: CLINIC | Age: 74
End: 2024-09-04
Payer: COMMERCIAL

## 2024-09-04 VITALS
DIASTOLIC BLOOD PRESSURE: 86 MMHG | SYSTOLIC BLOOD PRESSURE: 152 MMHG | OXYGEN SATURATION: 97 % | HEART RATE: 66 BPM | WEIGHT: 178 LBS | BODY MASS INDEX: 28.3 KG/M2

## 2024-09-04 DIAGNOSIS — I10 PRIMARY HYPERTENSION: Primary | ICD-10-CM

## 2024-09-04 DIAGNOSIS — F17.200 SMOKING: ICD-10-CM

## 2024-09-04 DIAGNOSIS — M48.062 SPINAL STENOSIS OF LUMBAR REGION WITH NEUROGENIC CLAUDICATION: ICD-10-CM

## 2024-09-04 DIAGNOSIS — Z71.6 ENCOUNTER FOR SMOKING CESSATION COUNSELING: ICD-10-CM

## 2024-09-04 PROCEDURE — 1159F MED LIST DOCD IN RCRD: CPT

## 2024-09-04 PROCEDURE — 1125F AMNT PAIN NOTED PAIN PRSNT: CPT

## 2024-09-04 PROCEDURE — 99214 OFFICE O/P EST MOD 30 MIN: CPT

## 2024-09-04 PROCEDURE — 4004F PT TOBACCO SCREEN RCVD TLK: CPT

## 2024-09-04 PROCEDURE — 1160F RVW MEDS BY RX/DR IN RCRD: CPT

## 2024-09-04 PROCEDURE — 3079F DIAST BP 80-89 MM HG: CPT

## 2024-09-04 PROCEDURE — 3077F SYST BP >= 140 MM HG: CPT

## 2024-09-04 RX ORDER — IBUPROFEN 200 MG
1 TABLET ORAL EVERY 24 HOURS
Qty: 30 PATCH | Refills: 0 | Status: SHIPPED | OUTPATIENT
Start: 2024-09-04 | End: 2024-10-04

## 2024-09-04 RX ORDER — METOPROLOL SUCCINATE 50 MG/1
50 TABLET, EXTENDED RELEASE ORAL DAILY
Qty: 90 TABLET | Refills: 1 | Status: SHIPPED | OUTPATIENT
Start: 2024-09-04 | End: 2025-03-03

## 2024-09-04 ASSESSMENT — ENCOUNTER SYMPTOMS
ORTHOPNEA: 0
SWEATS: 0
PALPITATIONS: 0
HYPERTENSION: 1
PND: 0
HEADACHES: 0
SHORTNESS OF BREATH: 0
NECK PAIN: 0
BLURRED VISION: 0

## 2024-09-04 ASSESSMENT — PATIENT HEALTH QUESTIONNAIRE - PHQ9
SUM OF ALL RESPONSES TO PHQ9 QUESTIONS 1 AND 2: 0
2. FEELING DOWN, DEPRESSED OR HOPELESS: NOT AT ALL
1. LITTLE INTEREST OR PLEASURE IN DOING THINGS: NOT AT ALL

## 2024-09-04 ASSESSMENT — LIFESTYLE VARIABLES: TOTAL SCORE: 3

## 2024-09-04 ASSESSMENT — PAIN SCALES - GENERAL: PAINLEVEL: 5

## 2024-09-04 NOTE — ASSESSMENT & PLAN NOTE
Chronic condition, needs tighter control at this visit  Continue Toprol XL, increase to 50 mg daily for elevated BP.  Monitor home blood pressures.  Call if blood pressure consistently >140/90.  Non pharmacological interventions such as lowering salt, saturated fats, cholesterol, and sugar diet discussed.  Increase physical activity, aim for 30 minutes 5 days per week as able.  Stress reduction interventions discussed.  Discussed signs and symptoms of major cardiovascular event and need to present to the ED.   Reevaluate in 1 months.  Referral to cardiology for cardiac clearance for impending spine surgery.     Orders:    Referral to Cardiology; Future    metoprolol succinate XL (Toprol-XL) 50 mg 24 hr tablet; Take 1 tablet (50 mg) by mouth once daily. Do not crush or chew.

## 2024-09-05 NOTE — ASSESSMENT & PLAN NOTE
Continue with Dr. Coburn as scheduled.  Pain management will be provided through Dr. Coburn as discussed.

## 2024-10-04 ENCOUNTER — APPOINTMENT (OUTPATIENT)
Dept: PRIMARY CARE | Facility: CLINIC | Age: 74
End: 2024-10-04
Payer: COMMERCIAL

## 2024-10-04 VITALS
WEIGHT: 173 LBS | BODY MASS INDEX: 27.5 KG/M2 | OXYGEN SATURATION: 98 % | DIASTOLIC BLOOD PRESSURE: 76 MMHG | SYSTOLIC BLOOD PRESSURE: 136 MMHG | HEART RATE: 76 BPM

## 2024-10-04 DIAGNOSIS — I10 PRIMARY HYPERTENSION: Primary | ICD-10-CM

## 2024-10-04 PROCEDURE — 3075F SYST BP GE 130 - 139MM HG: CPT

## 2024-10-04 PROCEDURE — 4004F PT TOBACCO SCREEN RCVD TLK: CPT

## 2024-10-04 PROCEDURE — 1159F MED LIST DOCD IN RCRD: CPT

## 2024-10-04 PROCEDURE — 3078F DIAST BP <80 MM HG: CPT

## 2024-10-04 PROCEDURE — 99213 OFFICE O/P EST LOW 20 MIN: CPT

## 2024-10-04 PROCEDURE — 1160F RVW MEDS BY RX/DR IN RCRD: CPT

## 2024-10-04 PROCEDURE — 1126F AMNT PAIN NOTED NONE PRSNT: CPT

## 2024-10-04 ASSESSMENT — PATIENT HEALTH QUESTIONNAIRE - PHQ9
1. LITTLE INTEREST OR PLEASURE IN DOING THINGS: NOT AT ALL
2. FEELING DOWN, DEPRESSED OR HOPELESS: NOT AT ALL
SUM OF ALL RESPONSES TO PHQ9 QUESTIONS 1 AND 2: 0

## 2024-10-04 ASSESSMENT — PAIN SCALES - GENERAL: PAINLEVEL: 0-NO PAIN

## 2024-10-04 NOTE — ASSESSMENT & PLAN NOTE
Chronic condition, much improved today  Continue lisinopril 40 mg daily and Toprol XL 50 mg  daily as prescribed.   Monitor home blood pressures.  Call if blood pressure consistently >140/90.  Non pharmacological interventions such as lowering salt, saturated fats, cholesterol, and sugar diet discussed.  Increase physical activity, aim for 30 minutes 5 days per week as able.  Stress reduction interventions discussed.  Discussed signs and symptoms of major cardiovascular event and need to present to the ED.   Reevaluate in 6 months.

## 2024-10-04 NOTE — PROGRESS NOTES
Subjective     Patient ID: Breonna Russo is a 74 y.o. female who presents for Hypertension.      HPI  Breonna presents for follow up of essential hypertension.   BP elevated at last visit 152/86 and borderline elevation at June visit.  Taking lisinopril 40 mg daily, increased Toprol XL to 50 mg daily 1 month ago.  Tolerating medications well.  BP today 136/76  Reports home blood pressure of 120s/80s, no reading above 140/90.   Denies change in vision, headache, or dizziness.   No complaints of chest pain, palpitations, or shortness of breath.    Patient's recent visit notes, medication and allergy lists, past medical surgical social hx, immunization, vitals, problem list, recent tests were reviewed by me for pertinence to this visit.    Current Outpatient Medications:     aspirin 81 mg EC tablet, Take by mouth., Disp: , Rfl:     brimonidine (AlphaGAN) 0.2 % ophthalmic solution, Administer into affected eye(s)., Disp: , Rfl:     dorzolamide-timoloL (Cosopt) 22.3-6.8 mg/mL ophthalmic solution, INSTILL ONE DROP INTO LEFT EYE TWICE DAILY, Disp: , Rfl:     ibuprofen (IBU) 800 mg tablet, Take 1 tablet (800 mg) by mouth 3 times a day as needed for moderate pain (4 - 6)., Disp: 60 tablet, Rfl: 1    latanoprost (Xalatan) 0.005 % ophthalmic solution, INSTILL ONE DROP INTO EACH EYE AT BEDTIME, Disp: , Rfl:     lisinopril 40 mg tablet, Take 1 tablet (40 mg) by mouth once daily., Disp: 90 tablet, Rfl: 1    metoprolol succinate XL (Toprol-XL) 50 mg 24 hr tablet, Take 1 tablet (50 mg) by mouth once daily. Do not crush or chew., Disp: 90 tablet, Rfl: 1    traMADol (Ultram) 50 mg tablet, Take 1 tablet (50 mg) by mouth once daily as needed for severe pain (7 - 10)., Disp: 30 tablet, Rfl: 0      Review of Systems  All other systems have been reviewed and are negative except as noted in the HPI.         Objective   /76 (BP Location: Left arm)   Pulse 76   Wt 78.5 kg (173 lb)   SpO2 98%   BMI 27.50 kg/m²       Physical  Exam  Vitals and nursing note reviewed.   Constitutional:       General: She is not in acute distress.     Appearance: Normal appearance.   Neck:      Vascular: No carotid bruit.   Cardiovascular:      Rate and Rhythm: Normal rate and regular rhythm.      Pulses: Normal pulses.      Heart sounds: Normal heart sounds, S1 normal and S2 normal. No murmur heard.  Pulmonary:      Effort: Pulmonary effort is normal.      Breath sounds: Normal breath sounds and air entry.   Musculoskeletal:      Cervical back: Normal range of motion and neck supple.      Right lower leg: No edema.      Left lower leg: No edema.   Lymphadenopathy:      Cervical: No cervical adenopathy.   Skin:     General: Skin is warm and dry.   Neurological:      General: No focal deficit present.      Mental Status: She is alert. Mental status is at baseline.   Psychiatric:         Behavior: Behavior is cooperative.             Assessment & Plan  Primary hypertension  Chronic condition, much improved today  Continue lisinopril 40 mg daily and Toprol XL 50 mg  daily as prescribed.   Monitor home blood pressures.  Call if blood pressure consistently >140/90.  Non pharmacological interventions such as lowering salt, saturated fats, cholesterol, and sugar diet discussed.  Increase physical activity, aim for 30 minutes 5 days per week as able.  Stress reduction interventions discussed.  Discussed signs and symptoms of major cardiovascular event and need to present to the ED.   Reevaluate in 6 months.                 Patient understands and agrees with treatment plan.    AIXA Goyal-CNP

## 2024-10-08 ENCOUNTER — APPOINTMENT (OUTPATIENT)
Dept: CARDIOLOGY | Facility: CLINIC | Age: 74
End: 2024-10-08
Payer: COMMERCIAL

## 2024-12-06 ENCOUNTER — DOCUMENTATION (OUTPATIENT)
Dept: PHYSICAL THERAPY | Facility: CLINIC | Age: 74
End: 2024-12-06
Payer: COMMERCIAL

## 2024-12-06 NOTE — PROGRESS NOTES
Physical Therapy    Discharge Summary    Name: Breonna Russo  MRN: [id number]  Date: 12/6/2024    Discharge Information:   Date of discharge 12/6/24  Date of last visit 8/6/24  Date of evaluation 8/6/24  Number of attended visits 1 initial evaluation only, never returned  Referred by Dr. Pandya    Referred for spinal stenosis, low back pain    Therapy Summary: patient never returned after initial evaluation    Discharge Reason(s): Attendance inconsistent

## 2025-01-11 DIAGNOSIS — I10 PRIMARY HYPERTENSION: ICD-10-CM

## 2025-01-13 RX ORDER — LISINOPRIL 40 MG/1
40 TABLET ORAL DAILY
Qty: 90 TABLET | Refills: 0 | Status: SHIPPED | OUTPATIENT
Start: 2025-01-13

## 2025-03-03 DIAGNOSIS — I10 PRIMARY HYPERTENSION: ICD-10-CM

## 2025-03-03 RX ORDER — METOPROLOL SUCCINATE 50 MG/1
50 TABLET, EXTENDED RELEASE ORAL DAILY
Qty: 90 TABLET | Refills: 0 | Status: SHIPPED | OUTPATIENT
Start: 2025-03-03

## 2025-03-05 ENCOUNTER — TELEPHONE (OUTPATIENT)
Dept: PRIMARY CARE | Facility: CLINIC | Age: 75
End: 2025-03-05
Payer: COMMERCIAL

## 2025-03-05 DIAGNOSIS — Z00.00 ANNUAL PHYSICAL EXAM: ICD-10-CM

## 2025-03-05 DIAGNOSIS — I10 PRIMARY HYPERTENSION: ICD-10-CM

## 2025-04-06 DIAGNOSIS — I10 PRIMARY HYPERTENSION: ICD-10-CM

## 2025-04-07 RX ORDER — LISINOPRIL 40 MG/1
40 TABLET ORAL DAILY
Qty: 90 TABLET | Refills: 0 | Status: SHIPPED | OUTPATIENT
Start: 2025-04-07

## 2025-04-18 DIAGNOSIS — Z00.00 ANNUAL PHYSICAL EXAM: ICD-10-CM

## 2025-04-18 DIAGNOSIS — I10 PRIMARY HYPERTENSION: ICD-10-CM

## 2025-04-29 ENCOUNTER — APPOINTMENT (OUTPATIENT)
Dept: PRIMARY CARE | Facility: CLINIC | Age: 75
End: 2025-04-29
Payer: COMMERCIAL

## 2025-06-03 ENCOUNTER — APPOINTMENT (OUTPATIENT)
Dept: PRIMARY CARE | Facility: CLINIC | Age: 75
End: 2025-06-03
Payer: COMMERCIAL

## 2025-06-07 DIAGNOSIS — I10 PRIMARY HYPERTENSION: ICD-10-CM

## 2025-06-09 RX ORDER — METOPROLOL SUCCINATE 50 MG/1
50 TABLET, EXTENDED RELEASE ORAL DAILY
Qty: 90 TABLET | Refills: 0 | Status: SHIPPED | OUTPATIENT
Start: 2025-06-09

## 2025-06-12 ENCOUNTER — HOSPITAL ENCOUNTER (OUTPATIENT)
Dept: RADIOLOGY | Facility: CLINIC | Age: 75
Discharge: HOME | End: 2025-06-12
Payer: MEDICARE

## 2025-06-12 DIAGNOSIS — M48.062 SPINAL STENOSIS, LUMBAR REGION WITH NEUROGENIC CLAUDICATION: ICD-10-CM

## 2025-06-12 PROCEDURE — 72148 MRI LUMBAR SPINE W/O DYE: CPT

## 2025-06-13 LAB
ALBUMIN SERPL-MCNC: 4.3 G/DL (ref 3.6–5.1)
ALP SERPL-CCNC: 126 U/L (ref 37–153)
ALT SERPL-CCNC: 23 U/L (ref 6–29)
ANION GAP SERPL CALCULATED.4IONS-SCNC: 8 MMOL/L (CALC) (ref 7–17)
AST SERPL-CCNC: 26 U/L (ref 10–35)
BASOPHILS # BLD AUTO: 60 CELLS/UL (ref 0–200)
BASOPHILS NFR BLD AUTO: 0.7 %
BILIRUB SERPL-MCNC: 0.5 MG/DL (ref 0.2–1.2)
BUN SERPL-MCNC: 7 MG/DL (ref 7–25)
CALCIUM SERPL-MCNC: 9.1 MG/DL (ref 8.6–10.4)
CHLORIDE SERPL-SCNC: 102 MMOL/L (ref 98–110)
CHOLEST SERPL-MCNC: 166 MG/DL
CHOLEST/HDLC SERPL: 2.3 (CALC)
CO2 SERPL-SCNC: 27 MMOL/L (ref 20–32)
CREAT SERPL-MCNC: 0.69 MG/DL (ref 0.6–1)
EGFRCR SERPLBLD CKD-EPI 2021: 92 ML/MIN/1.73M2
EOSINOPHIL # BLD AUTO: 68 CELLS/UL (ref 15–500)
EOSINOPHIL NFR BLD AUTO: 0.8 %
ERYTHROCYTE [DISTWIDTH] IN BLOOD BY AUTOMATED COUNT: 13.6 % (ref 11–15)
GLUCOSE SERPL-MCNC: 124 MG/DL (ref 65–99)
HCT VFR BLD AUTO: 41.9 % (ref 35–45)
HDLC SERPL-MCNC: 71 MG/DL
HGB BLD-MCNC: 13.6 G/DL (ref 11.7–15.5)
LDLC SERPL CALC-MCNC: 76 MG/DL (CALC)
LYMPHOCYTES # BLD AUTO: 1105 CELLS/UL (ref 850–3900)
LYMPHOCYTES NFR BLD AUTO: 13 %
MCH RBC QN AUTO: 32.7 PG (ref 27–33)
MCHC RBC AUTO-ENTMCNC: 32.5 G/DL (ref 32–36)
MCV RBC AUTO: 100.7 FL (ref 80–100)
MONOCYTES # BLD AUTO: 442 CELLS/UL (ref 200–950)
MONOCYTES NFR BLD AUTO: 5.2 %
NEUTROPHILS # BLD AUTO: 6826 CELLS/UL (ref 1500–7800)
NEUTROPHILS NFR BLD AUTO: 80.3 %
NONHDLC SERPL-MCNC: 95 MG/DL (CALC)
PLATELET # BLD AUTO: 318 THOUSAND/UL (ref 140–400)
PMV BLD REES-ECKER: 10 FL (ref 7.5–12.5)
POTASSIUM SERPL-SCNC: 4.2 MMOL/L (ref 3.5–5.3)
PROT SERPL-MCNC: 6.6 G/DL (ref 6.1–8.1)
RBC # BLD AUTO: 4.16 MILLION/UL (ref 3.8–5.1)
SODIUM SERPL-SCNC: 137 MMOL/L (ref 135–146)
TRIGL SERPL-MCNC: 105 MG/DL
WBC # BLD AUTO: 8.5 THOUSAND/UL (ref 3.8–10.8)

## 2025-06-17 LAB
ALBUMIN/CREAT UR: NORMAL MG/G CREAT
APPEARANCE UR: CLEAR
BILIRUB UR QL STRIP: NEGATIVE
COLOR UR: YELLOW
CREAT UR-MCNC: 37 MG/DL (ref 20–275)
GLUCOSE UR QL STRIP: NEGATIVE
HGB UR QL STRIP: NEGATIVE
KETONES UR QL STRIP: NEGATIVE
LEUKOCYTE ESTERASE UR QL STRIP: NEGATIVE
MICROALBUMIN UR-MCNC: <0.2 MG/DL
NITRITE UR QL STRIP: NEGATIVE
PH UR STRIP: 6.5 [PH] (ref 5–8)
PROT UR QL STRIP: NEGATIVE
SP GR UR STRIP: 1 (ref 1–1.03)

## 2025-06-18 ENCOUNTER — APPOINTMENT (OUTPATIENT)
Dept: PRIMARY CARE | Facility: CLINIC | Age: 75
End: 2025-06-18
Payer: COMMERCIAL

## 2025-06-18 VITALS
SYSTOLIC BLOOD PRESSURE: 148 MMHG | HEART RATE: 72 BPM | DIASTOLIC BLOOD PRESSURE: 80 MMHG | BODY MASS INDEX: 27.47 KG/M2 | HEIGHT: 67 IN | OXYGEN SATURATION: 96 % | WEIGHT: 175 LBS | TEMPERATURE: 98 F

## 2025-06-18 DIAGNOSIS — F17.210 SMOKING GREATER THAN 30 PACK YEARS: ICD-10-CM

## 2025-06-18 DIAGNOSIS — I10 PRIMARY HYPERTENSION: ICD-10-CM

## 2025-06-18 DIAGNOSIS — Z12.9 SCREENING FOR CANCER: ICD-10-CM

## 2025-06-18 DIAGNOSIS — Z00.00 MEDICARE ANNUAL WELLNESS VISIT, SUBSEQUENT: Primary | ICD-10-CM

## 2025-06-18 DIAGNOSIS — J43.9 PULMONARY EMPHYSEMA, UNSPECIFIED EMPHYSEMA TYPE (MULTI): ICD-10-CM

## 2025-06-18 DIAGNOSIS — N32.81 OAB (OVERACTIVE BLADDER): ICD-10-CM

## 2025-06-18 RX ORDER — OXYBUTYNIN CHLORIDE 5 MG/1
5 TABLET, EXTENDED RELEASE ORAL DAILY
Qty: 90 TABLET | Refills: 1 | Status: SHIPPED | OUTPATIENT
Start: 2025-06-18 | End: 2025-12-15

## 2025-06-18 ASSESSMENT — ACTIVITIES OF DAILY LIVING (ADL)
MANAGING_FINANCES: INDEPENDENT
BATHING: INDEPENDENT
DRESSING: INDEPENDENT
GROCERY_SHOPPING: NEEDS ASSISTANCE
DOING_HOUSEWORK: NEEDS ASSISTANCE
TAKING_MEDICATION: INDEPENDENT

## 2025-06-18 ASSESSMENT — COLUMBIA-SUICIDE SEVERITY RATING SCALE - C-SSRS
2. HAVE YOU ACTUALLY HAD ANY THOUGHTS OF KILLING YOURSELF?: NO
6. HAVE YOU EVER DONE ANYTHING, STARTED TO DO ANYTHING, OR PREPARED TO DO ANYTHING TO END YOUR LIFE?: NO
1. IN THE PAST MONTH, HAVE YOU WISHED YOU WERE DEAD OR WISHED YOU COULD GO TO SLEEP AND NOT WAKE UP?: NO

## 2025-06-18 ASSESSMENT — PATIENT HEALTH QUESTIONNAIRE - PHQ9
SUM OF ALL RESPONSES TO PHQ9 QUESTIONS 1 AND 2: 0
1. LITTLE INTEREST OR PLEASURE IN DOING THINGS: NOT AT ALL
2. FEELING DOWN, DEPRESSED OR HOPELESS: NOT AT ALL

## 2025-06-18 NOTE — PROGRESS NOTES
Subjective   Reason for Visit: Breonna Russo is an 75 y.o. female here for a Medicare Wellness visit.     Past Medical, Surgical, and Family History reviewed and updated in chart.    Reviewed all medications by prescribing practitioner or clinical pharmacist (such as prescriptions, OTCs, herbal therapies and supplements) and documented in the medical record.    HPI    Breonna Russo presents for medicare wellness exam.  No recent hospitalizations or illness.       Patient's recent visit notes, medication and allergy lists, past medical surgical social hx, immunization, vitals, problem list, recent tests were reviewed by me for pertinence to this visit.      PMH:   HTN- Taking lisinopril 40 mg daily, Toprol XL 25 mg daily Tolerating medications well.Denies change in vision, headache, or dizziness. No complaints of chest pain, palpitations, or shortness of breath.    Chronic left shoulder pain secondary to OA and chronic spinal stenosis lumbar pain- following with Dr. Pandya, recent procedure with Dr. Pandya which created leg numbness.  Has consulted with Dr. Madison in the past but declines surgical intervention.    Social Hx:  Lives at home with a roommate  Smoking: Yes - 3-5 cigarettes per day, has tried quitting in the past- has tied multiple cessation modalities, not interested in cessation now  Alcohol: Yes - white wine in the evenings- 2 glasses per night, cuts in half with club soda  Recreational drug use: No      Screening tools:  EKG: Yes - 2021- declines update today  Low density chest CT: No- agreeable to screening, will place order  Colonoscopy: No- declines any screening at this visit  Mammogram: completed once 20 years ago- declines any further screening  PAP: No- declines any further screenings  DEXA: No- declines screening today      Vaccinations:  Tdap: 2016  Flu Vaccine: UTD  Shingles: will consider for future  Prevnar 20: 2023        Patient Care Team:  AIXA Goyal-CNP as PCP - General  "(Family Medicine)  AIXA Goyal-CNP as PCP - [employer] PCP     Review of Systems  GENERAL - Denies fever/chills, recent illness, unexplained weight loss  HEENT- Denies change in vision, double vision, blurred. Denies hearing changes, ear pain. Denies nose bleeds. Denies sore throat, difficulty swallowing.    RESP - Denies SOB or cough  CVS - Denies CP, palpitations  GI - Denies nausea or abdominal pain, hematochezia/melena  - Positive for urinary frequency, urgency or incontinence.    NEURO - Denies headache, dizziness  MSK - Denies joint, neck. Positive for chronic back pain  Skin - Denies abnormal lesions, rash  PSYCH-Denies anxiety, depression, changes in mood      Objective   Vitals:  /80 (BP Location: Left arm, Patient Position: Sitting, BP Cuff Size: Adult)   Pulse 72   Temp 36.7 °C (98 °F) (Temporal)   Ht 1.702 m (5' 7\")   Wt 79.4 kg (175 lb)   SpO2 96%   BMI 27.41 kg/m²       Physical Exam  Pt is A and O x3, NAD  Head- normocephalic and atraumatic,   EYES- conjunctiva- normal, ANTHONY, lids- normal  EARS/NOSE- TM's normal, nasopharynx- normal and atraumatic  OROPHARYNX- normal  NECK- supple, FROM  THYROID- NT, normal size, no nodule noted  LYMPH- no cervical lymph nodes palpated   CV- RRR without murmur  PULM- CTA bilaterally, normal respiratory effort  RESPIRATORY EFFORT- normal , no retractions or nasal flaring   ABD- normoactive BS's , soft , NT, no hepatosplenomegaly palpated  EXT- no edema, non-tender. 5/5 strength, equal bilaterally upper and lower extremities  SKIN- no abnormal skin lesions noted  NEURO- no focal deficits  PSYCH- pleasant, normal judgement and insight    Assessment & Plan  Medicare annual wellness visit, subsequent  Well adult exam.  1. Age appropriate preventative measures reviewed.   2. Encouraged healthy diet and exercise.  3. Immunizations- Reviewed  4. Labs-reviewed with patient  5. Medications- Reviewed    *Follow-up in 1 year for repeat " annual physical exam. Patient verbalizes understanding  regarding plan of care and all questions answered.         Primary hypertension  Chronic condition, much improved today  Continue lisinopril 40 mg daily and Toprol XL 50 mg  daily as prescribed.   Monitor home blood pressures.  Call if blood pressure consistently >140/90.  Non pharmacological interventions such as lowering salt, saturated fats, cholesterol, and sugar diet discussed.  Increase physical activity, aim for 30 minutes 5 days per week as able.  Stress reduction interventions discussed.  Discussed signs and symptoms of major cardiovascular event and need to present to the ED.   Reevaluate in 6 months.              OAB (overactive bladder)  New concern needing tighter symptom management  UA shows no signs for infection.  Urgency and frequency has begun interfering with daily life  Begin oxybutynin XL 5 mg daily. Explained intended effects, potential side effects, and schedule of dosages of the medication.  She will follow up if symptoms worsen, persist or any side effects we discussed. Otherwise, follow up in 6 months.     Orders:    oxyBUTYnin XL (Ditropan XL) 5 mg 24 hr tablet; Take 1 tablet (5 mg) by mouth once daily. Do not crush, chew, or split. Take at same time each day.    Smoking greater than 30 pack years  Encouraged smoking cessation, has tried multiple attempts to quit, not interested at this time.  Agreeable to complete CT lung screening, will follow p with results upon completion  Orders:    CT lung screening low dose; Future    Screening for cancer    Orders:    CT lung screening low dose; Future    Pulmonary emphysema, unspecified emphysema type (Multi)  Chronic condition, stable today- respiratory exam unremarkable  Encouraged smoking cessation  Follow up in 6 months or sooner if symptoms change/worsen

## 2025-06-18 NOTE — ASSESSMENT & PLAN NOTE
New concern needing tighter symptom management  UA shows no signs for infection.  Urgency and frequency has begun interfering with daily life  Begin oxybutynin XL 5 mg daily. Explained intended effects, potential side effects, and schedule of dosages of the medication.  She will follow up if symptoms worsen, persist or any side effects we discussed. Otherwise, follow up in 6 months.     Orders:    oxyBUTYnin XL (Ditropan XL) 5 mg 24 hr tablet; Take 1 tablet (5 mg) by mouth once daily. Do not crush, chew, or split. Take at same time each day.

## 2025-06-28 NOTE — ASSESSMENT & PLAN NOTE
Chronic condition, stable today- respiratory exam unremarkable  Encouraged smoking cessation  Follow up in 6 months or sooner if symptoms change/worsen

## 2025-07-02 DIAGNOSIS — I10 PRIMARY HYPERTENSION: ICD-10-CM

## 2025-07-02 RX ORDER — LISINOPRIL 40 MG/1
40 TABLET ORAL DAILY
Qty: 90 TABLET | Refills: 1 | Status: SHIPPED | OUTPATIENT
Start: 2025-07-02

## 2025-07-16 ENCOUNTER — PATIENT MESSAGE (OUTPATIENT)
Dept: CARE COORDINATION | Facility: CLINIC | Age: 75
End: 2025-07-16
Payer: MEDICARE

## 2025-08-19 DIAGNOSIS — Z12.31 ENCOUNTER FOR SCREENING MAMMOGRAM FOR BREAST CANCER: ICD-10-CM

## 2025-08-30 DIAGNOSIS — I10 PRIMARY HYPERTENSION: ICD-10-CM

## 2025-09-03 RX ORDER — METOPROLOL SUCCINATE 50 MG/1
50 TABLET, EXTENDED RELEASE ORAL DAILY
Qty: 90 TABLET | Refills: 0 | Status: SHIPPED | OUTPATIENT
Start: 2025-09-03 | End: 2025-09-07

## 2025-09-04 DIAGNOSIS — I10 PRIMARY HYPERTENSION: ICD-10-CM

## 2025-09-07 RX ORDER — METOPROLOL SUCCINATE 50 MG/1
50 TABLET, EXTENDED RELEASE ORAL DAILY
Qty: 90 TABLET | Refills: 0 | Status: SHIPPED | OUTPATIENT
Start: 2025-09-07